# Patient Record
Sex: FEMALE | Race: WHITE
[De-identification: names, ages, dates, MRNs, and addresses within clinical notes are randomized per-mention and may not be internally consistent; named-entity substitution may affect disease eponyms.]

---

## 2021-02-16 ENCOUNTER — HOSPITAL ENCOUNTER (OUTPATIENT)
Dept: HOSPITAL 5 - XRAY | Age: 46
Discharge: HOME | End: 2021-02-16
Attending: INTERNAL MEDICINE
Payer: COMMERCIAL

## 2021-02-16 DIAGNOSIS — R19.5: Primary | ICD-10-CM

## 2021-02-16 DIAGNOSIS — N39.0: ICD-10-CM

## 2021-02-16 LAB
ALBUMIN SERPL-MCNC: 4.2 G/DL (ref 3.9–5)
ALT SERPL-CCNC: 8 UNITS/L (ref 7–56)
BACTERIA #/AREA URNS HPF: (no result) /HPF
BASOPHILS # (AUTO): 0 K/MM3 (ref 0–0.1)
BASOPHILS NFR BLD AUTO: 0.5 % (ref 0–1.8)
BILIRUB UR QL STRIP: (no result)
BLOOD UR QL VISUAL: (no result)
BUN SERPL-MCNC: 14 MG/DL (ref 7–17)
BUN/CREAT SERPL: 20 %
CALCIUM SERPL-MCNC: 8.8 MG/DL (ref 8.4–10.2)
EOSINOPHIL # BLD AUTO: 0.1 K/MM3 (ref 0–0.4)
EOSINOPHIL NFR BLD AUTO: 1.5 % (ref 0–4.3)
HCT VFR BLD CALC: 37.5 % (ref 30.3–42.9)
HDLC SERPL-MCNC: 62 MG/DL (ref 40–59)
HEMOLYSIS INDEX: 0
HGB BLD-MCNC: 12.9 GM/DL (ref 10.1–14.3)
LYMPHOCYTES # BLD AUTO: 1.6 K/MM3 (ref 1.2–5.4)
LYMPHOCYTES NFR BLD AUTO: 25.1 % (ref 13.4–35)
MCHC RBC AUTO-ENTMCNC: 34 % (ref 30–34)
MCV RBC AUTO: 93 FL (ref 79–97)
MONOCYTES # (AUTO): 0.4 K/MM3 (ref 0–0.8)
MONOCYTES % (AUTO): 6.1 % (ref 0–7.3)
MUCOUS THREADS #/AREA URNS HPF: (no result) /HPF
PH UR STRIP: 6 [PH] (ref 5–7)
PLATELET # BLD: 217 K/MM3 (ref 140–440)
PROT UR STRIP-MCNC: (no result) MG/DL
RBC # BLD AUTO: 4.03 M/MM3 (ref 3.65–5.03)
RBC #/AREA URNS HPF: 4 /HPF (ref 0–6)
UROBILINOGEN UR-MCNC: < 2 MG/DL (ref ?–2)
WBC #/AREA URNS HPF: 7 /HPF (ref 0–6)

## 2021-02-16 PROCEDURE — 74018 RADEX ABDOMEN 1 VIEW: CPT

## 2021-02-16 PROCEDURE — 84443 ASSAY THYROID STIM HORMONE: CPT

## 2021-02-16 PROCEDURE — 80053 COMPREHEN METABOLIC PANEL: CPT

## 2021-02-16 PROCEDURE — 81001 URINALYSIS AUTO W/SCOPE: CPT

## 2021-02-16 PROCEDURE — 85025 COMPLETE CBC W/AUTO DIFF WBC: CPT

## 2021-02-16 PROCEDURE — 80061 LIPID PANEL: CPT

## 2021-02-16 PROCEDURE — 82306 VITAMIN D 25 HYDROXY: CPT

## 2021-02-16 PROCEDURE — 36415 COLL VENOUS BLD VENIPUNCTURE: CPT

## 2021-02-16 PROCEDURE — 83036 HEMOGLOBIN GLYCOSYLATED A1C: CPT

## 2021-02-16 NOTE — XRAY REPORT
ABDOMEN 1 VIEW(S)



INDICATION / CLINICAL INFORMATION:  UNSPECIFIED ABDOMINAL PAIN.



COMPARISON:  None available.



FINDINGS:



TUBES / LINES: None.

BOWEL GAS PATTERN: Mild to moderate fecal matter is present throughout the colon. No evidence for obs
truction. 

FREE AIR / EXTRALUMINAL GAS: None seen.



ADDITIONAL FINDINGS: No significant additional findings.



IMPRESSION:

Consider mild fecal retention. No acute process.



Signer Name: Shane Jorge Jr, MD 

Signed: 2/16/2021 9:11 AM

Workstation Name: [id number]

## 2022-01-09 ENCOUNTER — HOSPITAL ENCOUNTER (EMERGENCY)
Dept: HOSPITAL 5 - ED | Age: 47
Discharge: HOME | End: 2022-01-09
Payer: COMMERCIAL

## 2022-01-09 VITALS — DIASTOLIC BLOOD PRESSURE: 76 MMHG | SYSTOLIC BLOOD PRESSURE: 121 MMHG

## 2022-01-09 DIAGNOSIS — R06.2: ICD-10-CM

## 2022-01-09 DIAGNOSIS — Z88.0: ICD-10-CM

## 2022-01-09 DIAGNOSIS — J06.9: Primary | ICD-10-CM

## 2022-01-09 PROCEDURE — 99282 EMERGENCY DEPT VISIT SF MDM: CPT

## 2022-01-09 NOTE — EMERGENCY DEPARTMENT REPORT
- General


Stated Complaint: CHEST CONGESTION


Time Seen by Provider: 01/09/22 22:45





- History of Present Illness


Initial Comments: 





Patient presents with a several day history of chest congestion and cough.  She 

has had subjective fevers and chills.  She has had generalized malaise with 

muscle aches and body aches.  She has been wheezing and having trouble 

breathing.  She does not know of any coronavirus exposure.  She has had no no 

influenza exposure.  She came here for evaluation treatment because she was not 

feeling well.





- Related Data


                                  Previous Rx's











 Medication  Instructions  Recorded  Last Taken  Type


 


Albuterol Sulfate [Proventil Hfa] 6.7 gm IH 4XD #1 inh 01/09/22 Unknown Rx


 


Benzonatate [Tessalon Perles] 100 mg PO Q8HR #30 cap 01/09/22 Unknown Rx


 


predniSONE [Deltasone] 50 mg PO QDAY #5 tab 01/09/22 Unknown Rx











                                    Allergies











Allergy/AdvReac Type Severity Reaction Status Date / Time


 


Penicillins Allergy  Rash Verified 01/09/22 22:46














ED Review of Systems


ROS: 


Stated complaint: CHEST CONGESTION


Other details as noted in HPI





Comment: All other systems reviewed and negative


Constitutional: fever


Eyes: denies: vision change


ENT: denies: throat pain


Respiratory: cough


Cardiovascular: denies: chest pain


Endocrine: denies: unexplained weight loss


Gastrointestinal: denies: abdominal pain


Genitourinary: denies: dysuria


Musculoskeletal: denies: back pain


Skin: denies: rash


Neurological: denies: headache


Hematological/Lymphatic: denies: easy bruising





ED Past Medical Hx





- Past Medical History


Previous Medical History?: No





- Family History


Family history: no significant





- Medications


Home Medications: 


                                Home Medications











 Medication  Instructions  Recorded  Confirmed  Last Taken  Type


 


Albuterol Sulfate [Proventil Hfa] 6.7 gm IH 4XD #1 inh 01/09/22  Unknown Rx


 


Benzonatate [Tessalon Perles] 100 mg PO Q8HR #30 cap 01/09/22  Unknown Rx


 


predniSONE [Deltasone] 50 mg PO QDAY #5 tab 01/09/22  Unknown Rx














ED Physical Exam





- General


Limitations: No Limitations, Other (Pulse ox noted and normal)


General appearance: alert, in no apparent distress





- Head


Head exam: Present: atraumatic, normocephalic





- Eye


Eye exam: Present: normal appearance, EOMI





- ENT


ENT exam: Present: normal orophraynx, normal external ear exam





- Neck


Neck exam: Present: normal inspection.  Absent: meningismus





- Respiratory


Respiratory exam: Present: wheezes (Bilateral).  Absent: respiratory distress





- Cardiovascular


Cardiovascular Exam: Present: regular rate, normal rhythm





- GI/Abdominal


GI/Abdominal exam: Present: soft.  Absent: distended





- Extremities Exam


Extremities exam: Present: normal capillary refill.  Absent: pedal edema





- Back Exam


Back exam: Absent: CVA tenderness (R), CVA tenderness (L)





- Neurological Exam


Neurological exam: Present: alert, oriented X3, CN II-XII intact, normal gait.  

Absent: motor sensory deficit





- Psychiatric


Psychiatric exam: Present: normal affect, normal mood





- Skin


Skin exam: Present: warm, dry





ED Course


                                   Vital Signs











  01/09/22





  22:43


 


Temperature 98.6 F


 


Pulse Rate 68


 


Respiratory 18





Rate 


 


Blood Pressure 121/76





[Right] 


 


O2 Sat by Pulse 97





Oximetry 














- Reevaluation(s)


Reevaluation #1: 





01/10/22 05:12


Patient was discharged





ED Medical Decision Making





- Medical Decision Making





Patient presents with upper respiratory symptoms and wheezing.  She likely has 

coronavirus.  We have discussed isolation.  She does not appear to have adventit

ious breath sounds to suggest pneumonia.  She is not hypoxic.  She does not 

appear to be septic.  She does not have pedal edema suggestive of congestive 

heart failure and does not describe orthopnea.  Patient was treated 

symptomatically and referred for outpatient evaluation and follow-up.  She can 

isolate and quarantine at home.


Critical Care Time: No


Critical care attestation.: 


If time is entered above; I have spent that time in minutes in the direct care 

of this critically ill patient, excluding procedure time.








ED Disposition


Clinical Impression: 


 Acute URI, Wheezing





Disposition: 01 HOME / SELF CARE / HOMELESS


Is pt being admited?: No


Condition: Stable


Instructions:  Cough, Adult, Easy-to-Read, Viral Respiratory Infection, 

Easy-To-Read, How to Use a Dry Powder Inhaler, Easy-to-Read


Additional Instructions: 


Use Tylenol and ibuprofen for fever. Push fluids. Return for problems. Follow-up

 with your regular doctor.


Prescriptions: 


predniSONE [Deltasone] 50 mg PO QDAY #5 tab


Albuterol Sulfate [Proventil Hfa] 6.7 gm IH 4XD #1 inh


Benzonatate [Tessalon Perles] 100 mg PO Q8HR #30 cap


Referrals: 


PRIMARY CARE,MD [Primary Care Provider] - 3-5 Days


Print Language: [place]ian